# Patient Record
(demographics unavailable — no encounter records)

---

## 2024-12-05 NOTE — PHYSICAL EXAM
[Chaperone Present] : A chaperone was present in the examining room during all aspects of the physical examination [34808] : A chaperone was present during the pelvic exam. [Appropriately responsive] : appropriately responsive [Alert] : alert [No Acute Distress] : no acute distress [No Lymphadenopathy] : no lymphadenopathy [Regular Rate Rhythm] : regular rate rhythm [No Murmurs] : no murmurs [Clear to Auscultation B/L] : clear to auscultation bilaterally [Soft] : soft [Non-tender] : non-tender [Non-distended] : non-distended [No HSM] : No HSM [No Lesions] : no lesions [No Mass] : no mass [Oriented x3] : oriented x3 [Examination Of The Breasts] : a normal appearance [No Masses] : no breast masses were palpable [Labia Majora] : normal [Labia Minora] : normal [Normal] : normal [Uterine Adnexae] : normal [Thyroid Nodule] : thyroid nodule [Absent] : absent [FreeTextEntry2] : Migue Riggins [FreeTextEntry3] : known thyroid nodule--fullness right nodule [FreeTextEntry9] : Guaiac test negative, no masses noted

## 2024-12-05 NOTE — HISTORY OF PRESENT ILLNESS
[Patient reported mammogram was normal] : Patient reported mammogram was normal [FreeTextEntry1] : 2024. ADRIANA RAJPUT 62 year old female  LMP PM presents for annual visit.   She reports occasional vaginal dryness. She denies vaginal bleeding, abn discharge or vaginitis sxs. No urinary complaints. She has normal BM, no bloody stool. She denies abdominal or pelvic pain.  She reports recent lower extremity cramping. She had a recent left knee MRI and Doppler test.   GynHx: fibroids PMH: denies SHx: hysterectomy 2/2 fibroids, cataract surgery FHx: Mom Ovarian Ca age 77. Pt did genetic testing neg NBN variant. Maternal uncle w/ prostate ca. Denies FHx of breast, uterine or colon cancer. Meds: calcium, magnesium, centrum, Vit C and D3 PHQ9= 1 [TextBox_4] : 9/2022- right ovary not visualized, normal left ovary [Mammogramdate] : 9/2024 [TextBox_19] : Advised to send results.  [BreastSonogramDate] : 8/2022 [TextBox_25] : BIRADS2. Due now. Advised to schedule. Rx given. [PapSmeardate] : 10/2022 [TextBox_31] : NILM, HPV not detected [BoneDensityDate] : 9/2022 [TextBox_37] : wnl. Repeat due 2024.

## 2024-12-05 NOTE — PLAN
[FreeTextEntry1] : 62 year old female pt presents for routine gyn exam: Breast and pelvic exam performed Pap/HPV conducted Rx given for mammogram and breast sonogram Pt to schedule repeat pelvic sono due to FamHx of ovarian ca.  Advised to schedule colonoscopy. Referral to GI MD given. DXA d/w patient. Advised to repeat 2025   Vaginal Atrophy: Advised pt to use hyalogyn and other OTC lubricants (Ky jelly, Astroglide, coconut oil). d/w pt all R/B/A. Samples given.  Apply Aquaphor or Balmex PRN If s/sxs worsen, d/w pt alternative methods and hormonal txs, such as topical estrogen products. All R/B/A reviewed Rx given.   Known thyroid nodule to get repeat thryoid sono  RTO PRN   This note was written by Migue Riggins on 12/05/2024 actively solely Dr. Cristino RIVERA.   All medical record entries made by the Scribe were at my, Dr. Cristino LA direction and personally dictated by me on 12/05/2024. I have personally reviewed the chart and agree that the record reflects my personal performance of the history, physical exam, assessment, and plan.